# Patient Record
Sex: FEMALE | Race: WHITE | Employment: OTHER | URBAN - METROPOLITAN AREA
[De-identification: names, ages, dates, MRNs, and addresses within clinical notes are randomized per-mention and may not be internally consistent; named-entity substitution may affect disease eponyms.]

---

## 2017-06-05 ENCOUNTER — OFFICE VISIT (OUTPATIENT)
Dept: FAMILY MEDICINE CLINIC | Facility: CLINIC | Age: 67
End: 2017-06-05

## 2017-06-05 VITALS — RESPIRATION RATE: 20 BRPM | HEART RATE: 80 BPM | OXYGEN SATURATION: 93 %

## 2017-06-05 DIAGNOSIS — R21 RASH AND NONSPECIFIC SKIN ERUPTION: Primary | ICD-10-CM

## 2017-06-05 PROCEDURE — 99203 OFFICE O/P NEW LOW 30 MIN: CPT | Performed by: NURSE PRACTITIONER

## 2017-06-05 RX ORDER — CEPHALEXIN 500 MG/1
CAPSULE ORAL
COMMUNITY
Start: 2016-10-25 | End: 2017-10-31

## 2017-06-05 RX ORDER — BUMETANIDE 1 MG/1
TABLET ORAL
COMMUNITY
Start: 2016-09-25 | End: 2017-10-31

## 2017-06-05 RX ORDER — BUDESONIDE AND FORMOTEROL FUMARATE DIHYDRATE 160; 4.5 UG/1; UG/1
AEROSOL RESPIRATORY (INHALATION)
COMMUNITY
Start: 2016-10-10 | End: 2017-10-31

## 2017-06-05 RX ORDER — CLONAZEPAM 0.5 MG/1
TABLET ORAL
COMMUNITY
Start: 2016-09-09 | End: 2017-10-31

## 2017-06-05 RX ORDER — POTASSIUM CHLORIDE 20 MEQ/1
TABLET, EXTENDED RELEASE ORAL
COMMUNITY
Start: 2016-10-28 | End: 2017-10-31

## 2017-06-05 RX ORDER — OMEPRAZOLE 40 MG/1
CAPSULE, DELAYED RELEASE ORAL
COMMUNITY
Start: 2016-10-07 | End: 2017-10-31 | Stop reason: ALTCHOICE

## 2017-06-05 RX ORDER — WARFARIN SODIUM 6 MG/1
TABLET ORAL
COMMUNITY
Start: 2016-11-01 | End: 2017-10-23 | Stop reason: ALTCHOICE

## 2017-06-05 NOTE — PROGRESS NOTES
CHIEF COMPLAINT:   Patient presents with:  Rash: left lower leg for 2-3 months         HPI:   Rony Muñoz is a 75year old female who presents for evaluation of a rash. Per patient rash started in the past 2-3 months.   Reports was red and itchy, now jus Warfarin Sodium (COUMADIN) 5 MG Oral Tab Take 5 mg by mouth daily. Disp:  Rfl:    ramipril (ALTACE) 5 MG Oral Cap Take 5 mg by mouth daily.  Disp:  Rfl:    fluticasone-salmeterol (ADVAIR DISKUS) 500-50 MCG/DOSE Inhalation Aerosol Powder, Breath Activated No surrouding redness, warmth. No edema to area of increased redness. BLE with dry flaking skin. EYES: PERRLA, EOMI, conjunctiva are clear  CARDIO: LVAD present  JOINTS/MSK: normal ROM of lower extremities. Very mild non-pitting edema to BLE.  No calf ten

## 2017-10-06 ENCOUNTER — OFFICE VISIT (OUTPATIENT)
Dept: PHYSICAL THERAPY | Age: 67
End: 2017-10-06
Attending: INTERNAL MEDICINE
Payer: MEDICARE

## 2017-10-06 PROCEDURE — 97163 PT EVAL HIGH COMPLEX 45 MIN: CPT | Performed by: PHYSICAL THERAPIST

## 2017-10-06 NOTE — PROGRESS NOTES
NEUROLOGICAL EVALUATION:   Referring Physician: Surinder Riley MD  Diagnosis: Gait instability (R26.81)  Heart failure ([address] (I50.9)  LVAD (left ventricular assist device) present [facility]) (A94.885)     Date of Service: 10/6/2017     PATIENT Ramila Krishna awareness. These impairments are leading to functional limitations including;  Listed above    Alexandro Melvin would benefit from skilled Physical Therapy to address the above impairments to restore PLOF.      Precautions:  Pacemaker    LVAD (brand: Heartware) Restr clinical rationale, this evaluation involved High Complexity decision making due to 3+ personal factors/comorbidities, 4+ body structures involved/activity limitations, and unstable symptoms including vital sign response.   PLAN OF CARE:    Goals:  · Pt britta certification required: Yes  I certify the need for these services furnished under this plan of treatment and while under my care.     X___________________________________________________ Date____________________    Certification From: 50/1/2859  To:1/4/201

## 2017-10-09 ENCOUNTER — APPOINTMENT (OUTPATIENT)
Dept: PHYSICAL THERAPY | Age: 67
End: 2017-10-09
Payer: MEDICARE

## 2017-10-16 ENCOUNTER — HOSPITAL ENCOUNTER (OUTPATIENT)
Dept: PHYSICAL THERAPY | Facility: HOSPITAL | Age: 67
Setting detail: THERAPIES SERIES
Discharge: HOME | End: 2017-10-16
Attending: PHYSICAL MEDICINE & REHABILITATION
Payer: MEDICARE

## 2017-10-16 ENCOUNTER — TELEPHONE (OUTPATIENT)
Dept: PHYSICAL THERAPY | Facility: HOSPITAL | Age: 67
End: 2017-10-16

## 2017-10-16 PROCEDURE — 97112 NEUROMUSCULAR REEDUCATION: CPT

## 2017-10-16 PROCEDURE — 97110 THERAPEUTIC EXERCISES: CPT

## 2017-10-16 NOTE — PROGRESS NOTES
Dx: Gait instability, Heart failure, LVAD (left ventricular assist device) present          Authorized # of Visits:  10         Next MD visit: none scheduled  Fall Risk: High        Precautions:  Pacemaker, O2 2L,    LVAD (brand: Heartware) Restrictions: (10 visits)  · Pt will improve functional hip strength to demonstrate ability to ascend/descend 1 flight of stairs reciprocally without use of handrail (10 visits)  · Pt will be independent and compliant with comprehensive HEP to maintain progress achieved

## 2017-10-17 NOTE — ADDENDUM NOTE
Encounter addended by: Anastasiia Brito PT on: 10/16/2017  9:13 PM<BR>    Actions taken: Sign clinical note

## 2017-10-18 ENCOUNTER — TELEPHONE (OUTPATIENT)
Dept: PHYSICAL THERAPY | Facility: HOSPITAL | Age: 67
End: 2017-10-18

## 2017-10-18 ENCOUNTER — HOSPITAL ENCOUNTER (OUTPATIENT)
Dept: PHYSICAL THERAPY | Facility: HOSPITAL | Age: 67
Setting detail: THERAPIES SERIES
Discharge: HOME | End: 2017-10-18
Attending: PHYSICAL MEDICINE & REHABILITATION
Payer: MEDICARE

## 2017-10-18 PROCEDURE — 97112 NEUROMUSCULAR REEDUCATION: CPT

## 2017-10-18 PROCEDURE — 97530 THERAPEUTIC ACTIVITIES: CPT

## 2017-10-18 PROCEDURE — 97110 THERAPEUTIC EXERCISES: CPT

## 2017-10-18 NOTE — TELEPHONE ENCOUNTER
Spoke with patient's LVAD nurse coordinator Nuno Peña about patient weaning from 2L supplemental O2. Patient told her she has only been wearing at home when she feels she is in need of it.  Elvira Olea states ok for patient to use O2 in PT on as needed ba

## 2017-10-18 NOTE — PROGRESS NOTES
Dx: Gait instability, Heart failure, LVAD (left ventricular assist device) present          Authorized # of Visits:  10         Next MD visit: none scheduled  Fall Risk: High        Precautions:  Pacemaker, O2 2L,    LVAD (brand: Heartware) Restrictions: ability to ascend/descend 1 flight of stairs reciprocally without use of handrail (10 visits) - progress  · Pt will be independent and compliant with comprehensive HEP to maintain progress achieved in PT (10 visits) - issued    Plan: Continue balance, stre

## 2017-10-20 ENCOUNTER — APPOINTMENT (OUTPATIENT)
Dept: PHYSICAL THERAPY | Facility: HOSPITAL | Age: 67
End: 2017-10-20
Attending: PHYSICAL MEDICINE & REHABILITATION
Payer: MEDICARE

## 2017-10-23 PROBLEM — C50.919 MALIGNANT NEOPLASM OF BREAST (HCC): Status: ACTIVE | Noted: 2017-10-23

## 2017-10-23 PROBLEM — E04.2 MULTINODULAR GOITER: Status: ACTIVE | Noted: 2017-10-23

## 2017-10-23 PROBLEM — E04.2 MULTIPLE THYROID NODULES: Status: ACTIVE | Noted: 2017-10-23

## 2017-10-25 ENCOUNTER — HOSPITAL ENCOUNTER (OUTPATIENT)
Dept: PHYSICAL THERAPY | Facility: HOSPITAL | Age: 67
Setting detail: THERAPIES SERIES
Discharge: HOME | End: 2017-10-25
Attending: PHYSICAL MEDICINE & REHABILITATION
Payer: MEDICARE

## 2017-10-25 PROCEDURE — 97530 THERAPEUTIC ACTIVITIES: CPT

## 2017-10-25 PROCEDURE — 97110 THERAPEUTIC EXERCISES: CPT

## 2017-10-25 PROCEDURE — 97112 NEUROMUSCULAR REEDUCATION: CPT

## 2017-10-25 NOTE — PROGRESS NOTES
Dx: Gait instability, Heart failure, LVAD (left ventricular assist device) present          Authorized # of Visits:  10         Next MD visit: none scheduled  Fall Risk: High        Precautions:  Pacemaker, O2 2L,    LVAD (brand: Heartware) Restrictions: participation in ADL such as community ambulation(10 visits) - progress  · Pt will perform DGI with score of 20/24 or greater with least restrictive AD to demonstrate ability to ambulate safely in crowded and busy environments (10 visits)  · Pt will be abl throughout session       - Airex beam step ups, no UEs x 8 R/L Shuttle  - DLS, 51# (65# too hard) x 25       - Seated RTB rows x 20 Standing RTB rows with set-up x 20       Skilled Services: Continued activity tolerance training within patient's tolerance.

## 2017-10-27 ENCOUNTER — HOSPITAL ENCOUNTER (OUTPATIENT)
Dept: PHYSICAL THERAPY | Facility: HOSPITAL | Age: 67
Setting detail: THERAPIES SERIES
Discharge: HOME | End: 2017-10-27
Attending: PHYSICAL MEDICINE & REHABILITATION
Payer: MEDICARE

## 2017-10-27 PROCEDURE — 97112 NEUROMUSCULAR REEDUCATION: CPT

## 2017-10-27 PROCEDURE — 97110 THERAPEUTIC EXERCISES: CPT

## 2017-10-27 PROCEDURE — 97530 THERAPEUTIC ACTIVITIES: CPT

## 2017-10-27 NOTE — PROGRESS NOTES
Dx: Gait instability, Heart failure, LVAD (left ventricular assist device) present          Authorized # of Visits:  10         Next MD visit: none scheduled  Fall Risk: High        Precautions:  Pacemaker, O2 2L,    LVAD (brand: Heartware) Restrictions: use of handrail (10 visits) - progress, 13 steps with handrail assist, generally reciprocal, occasional step-to  · Pt will be independent and compliant with comprehensive HEP to maintain progress achieved in PT (10 visits) - issued and progressed    Plan: ups, no UEs x 8 R/L Shuttle  - DLS, 69# (83# too hard) x 25 Shuttle  - DLS, 58# x 30 (likes this a lot)      - Seated RTB rows x 20 Standing RTB rows with set-up x 20 Rockerboard  - AP x 25  - ML x 25      Skilled Services: Continued activity tolerance tra

## 2017-10-30 ENCOUNTER — HOSPITAL ENCOUNTER (OUTPATIENT)
Dept: PHYSICAL THERAPY | Facility: HOSPITAL | Age: 67
Setting detail: THERAPIES SERIES
Discharge: HOME | End: 2017-10-30
Attending: PHYSICAL MEDICINE & REHABILITATION
Payer: MEDICARE

## 2017-10-30 PROBLEM — J45.20 MILD INTERMITTENT ASTHMA WITHOUT COMPLICATION: Status: ACTIVE | Noted: 2017-10-30

## 2017-10-30 PROBLEM — I10 ESSENTIAL HYPERTENSION: Status: ACTIVE | Noted: 2017-10-30

## 2017-10-30 PROBLEM — K21.9 CHRONIC GERD: Status: ACTIVE | Noted: 2017-10-30

## 2017-10-30 PROBLEM — C50.912 MALIGNANT NEOPLASM OF LEFT FEMALE BREAST, UNSPECIFIED ESTROGEN RECEPTOR STATUS, UNSPECIFIED SITE OF BREAST (HCC): Status: ACTIVE | Noted: 2017-10-23

## 2017-10-30 PROCEDURE — 97530 THERAPEUTIC ACTIVITIES: CPT

## 2017-10-30 PROCEDURE — 97110 THERAPEUTIC EXERCISES: CPT

## 2017-10-30 PROCEDURE — 97112 NEUROMUSCULAR REEDUCATION: CPT

## 2017-10-30 NOTE — PROGRESS NOTES
Dx: Gait instability, Heart failure, LVAD (left ventricular assist device) present          Authorized # of Visits:  10         Next MD visit: none scheduled  Fall Risk: High        Precautions:  Pacemaker, O2 2L,    LVAD (brand: Heartware) Restrictions: will perform DGI with score of 20/24 or greater with least restrictive AD to demonstrate ability to ambulate safely in crowded and busy environments (10 visits)  · Pt will be able to squat to  light objects around the house without loss of stability - p/u 4# ball, press OH x 1 (tto tired)  - DLS catch on airex 2 x 10  - step ups to airex x 15 R/L - DLS on airex 4# OH ball lifts x 10 (tired)     Side step x 2 laps // bars with rest as needed Side step in bars, YTBx 2 laps - - Airex  - step to with catc

## 2017-11-01 ENCOUNTER — HOSPITAL ENCOUNTER (OUTPATIENT)
Dept: PHYSICAL THERAPY | Facility: HOSPITAL | Age: 67
Setting detail: THERAPIES SERIES
Discharge: HOME | End: 2017-11-01
Attending: PHYSICAL MEDICINE & REHABILITATION
Payer: MEDICARE

## 2017-11-01 PROCEDURE — 97110 THERAPEUTIC EXERCISES: CPT

## 2017-11-01 PROCEDURE — 97112 NEUROMUSCULAR REEDUCATION: CPT

## 2017-11-01 PROCEDURE — 97530 THERAPEUTIC ACTIVITIES: CPT

## 2017-11-01 NOTE — PROGRESS NOTES
Dx: Gait instability, Heart failure, LVAD (left ventricular assist device) present          Authorized # of Visits:  10         Next MD visit: none scheduled  Fall Risk: High        Precautions:  Pacemaker, O2 2L,    LVAD (brand: Heartware) Restrictions: ability to ascend/descend 1 flight of stairs reciprocally without use of handrail (10 visits) - progress, 13 steps with handrail assist, generally reciprocal, occasional step-to  · Pt will be independent and compliant with comprehensive HEP to maintain pro 2 laps // bars with rest as needed Side step in bars, YTBx 2 laps - - Airex  - step to with catch against rebounder x 10 R/L  - alt cone stack tap x 14, x 10 Airex  - DLS catch 2 x 10    P/u cones (6) from 6\">4\" box x 1 R/L ea P/u 4# crate from floor x 1

## 2017-11-06 ENCOUNTER — HOSPITAL ENCOUNTER (OUTPATIENT)
Dept: PHYSICAL THERAPY | Facility: HOSPITAL | Age: 67
Setting detail: THERAPIES SERIES
End: 2017-11-06
Attending: PHYSICAL MEDICINE & REHABILITATION
Payer: MEDICARE

## 2017-11-06 PROCEDURE — 97110 THERAPEUTIC EXERCISES: CPT

## 2017-11-06 NOTE — PROGRESS NOTES
Dx: Gait instability, Heart failure, LVAD (left ventricular assist device) present          Authorized # of Visits:  10         Next MD visit: none scheduled  Fall Risk: High        Precautions:  Pacemaker, O2 2L,    LVAD (brand: Heartware) Restrictions: in ADL such as community ambulation(10 visits) - original goal met, progress to <14 sec  · Pt will perform DGI with score of 20/24 or greater with least restrictive AD to demonstrate ability to ambulate safely in crowded and busy environments (10 visits) march, light UEs x 20  - alt tap to cone stack 2 x 10, light UEs       - alt tap to cone stack 2 x 10, light UEs Obstacle course; hurdles, airex, 4\" step; no AD, CGA x 6 (fatigued) - p/u 4# ball, press OH x 1 (tto tired)  - DLS catch on airex 2 x 10  - st KESHAWN castillo    Charges:  Therex x 2      Total Timed Treatment: 30 min  Total Treatment Time: 30 min

## 2017-11-08 ENCOUNTER — APPOINTMENT (OUTPATIENT)
Dept: PHYSICAL THERAPY | Facility: HOSPITAL | Age: 67
End: 2017-11-08
Attending: PHYSICAL MEDICINE & REHABILITATION
Payer: MEDICARE

## 2017-11-13 ENCOUNTER — APPOINTMENT (OUTPATIENT)
Dept: PHYSICAL THERAPY | Facility: HOSPITAL | Age: 67
End: 2017-11-13
Attending: PHYSICAL MEDICINE & REHABILITATION
Payer: MEDICARE

## 2017-11-15 ENCOUNTER — APPOINTMENT (OUTPATIENT)
Dept: PHYSICAL THERAPY | Facility: HOSPITAL | Age: 67
End: 2017-11-15
Attending: PHYSICAL MEDICINE & REHABILITATION
Payer: MEDICARE

## 2017-11-20 ENCOUNTER — TELEPHONE (OUTPATIENT)
Dept: PHYSICAL THERAPY | Facility: HOSPITAL | Age: 67
End: 2017-11-20

## 2017-11-20 ENCOUNTER — APPOINTMENT (OUTPATIENT)
Dept: PHYSICAL THERAPY | Facility: HOSPITAL | Age: 67
End: 2017-11-20
Attending: PHYSICAL MEDICINE & REHABILITATION
Payer: MEDICARE

## 2017-11-20 NOTE — TELEPHONE ENCOUNTER
Patient called to follow-up with physical therapist about her change in status. She was hospitalized last week for a GI bleed, but is back home now.  Has to cancel her appts for this week because hemoglobin levels are still so low that it makes it difficult

## 2017-11-22 ENCOUNTER — APPOINTMENT (OUTPATIENT)
Dept: PHYSICAL THERAPY | Facility: HOSPITAL | Age: 67
End: 2017-11-22
Attending: PHYSICAL MEDICINE & REHABILITATION
Payer: MEDICARE

## 2017-12-08 ENCOUNTER — HOSPITAL ENCOUNTER (OUTPATIENT)
Dept: PHYSICAL THERAPY | Facility: HOSPITAL | Age: 67
Setting detail: THERAPIES SERIES
Discharge: HOME | End: 2017-12-08
Attending: INTERNAL MEDICINE
Payer: MEDICARE

## 2017-12-08 PROCEDURE — 97110 THERAPEUTIC EXERCISES: CPT

## 2017-12-08 PROCEDURE — 97112 NEUROMUSCULAR REEDUCATION: CPT

## 2017-12-08 NOTE — PROGRESS NOTES
Progress Summary  Dx: Gait instability, Heart failure, LVAD (left ventricular assist device) present          Authorized # of Visits:  10         Next MD visit: none scheduled  Fall Risk: High        Precautions:  Pacemaker, O2 2L,    LVAD (brand: Heartwa together: >30 sec  Modified Tandem: >30 sec HELEN  Tandem: R back 26 sec, L back 24 sec  SLS: 6 sec HELEN    Goals: (To be completed in 18 visits or less)  · Pt will demonstrate improved SLS to >10 seconds HELEN to promote safety and decrease risk of falls on un at Dept: [phone]. Sincerely,  Electronically signed by therapist: Terry Alcantar PT    [de-identified] certification required: Yes  I certify the need for these services furnished under this plan of treatment and while under my care.     X_______________  press HELEN x 10  - 4# bicep curls HELEN x 10   Side step x 2 laps // bars with rest as needed Side step in bars, YTBx 2 laps - - Airex  - step to with catch against rebounder x 10 R/L  - alt cone stack tap x 14, x 10 Airex  - DLS catch 2 x 10 - Airex 45 min

## 2017-12-13 ENCOUNTER — HOSPITAL ENCOUNTER (OUTPATIENT)
Dept: PHYSICAL THERAPY | Facility: HOSPITAL | Age: 67
Setting detail: THERAPIES SERIES
Discharge: HOME | End: 2017-12-13
Attending: INTERNAL MEDICINE
Payer: MEDICARE

## 2017-12-13 PROCEDURE — 97530 THERAPEUTIC ACTIVITIES: CPT

## 2017-12-13 PROCEDURE — 97110 THERAPEUTIC EXERCISES: CPT

## 2017-12-13 PROCEDURE — 97112 NEUROMUSCULAR REEDUCATION: CPT

## 2017-12-13 NOTE — PROGRESS NOTES
Dx: Gait instability, Heart failure, LVAD (left ventricular assist device) present          Authorized # of Visits:  10         Next MD visit: none scheduled  Fall Risk: High        Precautions:  Pacemaker, O2 2L,    LVAD (brand: Heartware) Restrictions: be able to squat to  light objects around the house without loss of stability.  - progress  · Pt will improve functional hip strength to demonstrate ability to ascend/descend 1 flight of stairs reciprocally without use of handrail. - progress, 13 chano alt tap to cone stack 2 x 10, light UEs Obstacle course; hurdles, airex, 4\" step; no AD, CGA x 6 (fatigued) - p/u 4# ball, press OH x 1 (tto tired)  - DLS catch on airex 2 x 10  - step ups to airex x 15 R/L - DLS on airex 4# OH ball lifts x 10 (tired) 2# Rockerboard  - AP x 25  - ML x 25 Bosu  - alt fwd lunge x 10 R/L with standing rests  Bosu  - alt fwd lunge x 12 R/L with standing rests  - fwd step up x x 10 R/L  - Bosu  - alt fwd lunge, light to no UEs x 10 R/L Obstacle course: hurdles, airex, airex benson

## 2017-12-18 ENCOUNTER — HOSPITAL ENCOUNTER (OUTPATIENT)
Dept: PHYSICAL THERAPY | Facility: HOSPITAL | Age: 67
Setting detail: THERAPIES SERIES
Discharge: HOME | End: 2017-12-18
Attending: INTERNAL MEDICINE
Payer: MEDICARE

## 2017-12-18 PROCEDURE — 97112 NEUROMUSCULAR REEDUCATION: CPT

## 2017-12-18 PROCEDURE — 97110 THERAPEUTIC EXERCISES: CPT

## 2017-12-18 PROCEDURE — 97530 THERAPEUTIC ACTIVITIES: CPT

## 2017-12-20 ENCOUNTER — HOSPITAL ENCOUNTER (OUTPATIENT)
Dept: PHYSICAL THERAPY | Facility: HOSPITAL | Age: 67
Setting detail: THERAPIES SERIES
Discharge: HOME | End: 2017-12-20
Attending: INTERNAL MEDICINE
Payer: MEDICARE

## 2017-12-20 PROCEDURE — 97530 THERAPEUTIC ACTIVITIES: CPT

## 2017-12-20 PROCEDURE — 97112 NEUROMUSCULAR REEDUCATION: CPT

## 2017-12-20 PROCEDURE — 97110 THERAPEUTIC EXERCISES: CPT

## 2017-12-20 NOTE — PROGRESS NOTES
Dx: Gait instability, Heart failure, LVAD (left ventricular assist device) present          Authorized # of Visits:  10         Next MD visit: none scheduled  Fall Risk: High        Precautions:  Pacemaker, O2 2L,    LVAD (brand: Heartware) Restrictions: such as grass. - progress  · Pt will perform TUG in <20 seconds with least restrictive AD, demonstrating improved gait speed for improved participation in ADL such as community ambulation.  - original goal met, progress to <10 sec  · Pt will perform DGI wit ascent, occasional step-to descent 6\" step up  - fwd x 12 R/L  - side step up/over x 12 (several rests due to fatigue) Flight of stairs with handrail x 13 steps, reciprocal CGA for safety (improved) - - - Flight of stairs with handrail x 8 steps, reciproc control)  - multi-direct catch, feet together x 2 min Large rockerboard  - AP x 20  - balance 3 x 20s  - Bosu  - alt wt shift/lunge occ UEs x 12 R/L  - fwd step up, light UEs x 11 R/L   Shuttle  - DLS, 49# (98# too hard) x 25 Shuttle  - DLS, 86# x 30 (like

## 2017-12-27 ENCOUNTER — HOSPITAL ENCOUNTER (OUTPATIENT)
Dept: PHYSICAL THERAPY | Facility: HOSPITAL | Age: 67
Setting detail: THERAPIES SERIES
Discharge: HOME | End: 2017-12-27
Attending: INTERNAL MEDICINE
Payer: MEDICARE

## 2017-12-27 PROCEDURE — 97110 THERAPEUTIC EXERCISES: CPT

## 2017-12-27 PROCEDURE — 97112 NEUROMUSCULAR REEDUCATION: CPT

## 2017-12-29 ENCOUNTER — HOSPITAL ENCOUNTER (OUTPATIENT)
Dept: PHYSICAL THERAPY | Facility: HOSPITAL | Age: 67
Setting detail: THERAPIES SERIES
Discharge: HOME | End: 2017-12-29
Attending: INTERNAL MEDICINE
Payer: MEDICARE

## 2017-12-29 PROCEDURE — 97112 NEUROMUSCULAR REEDUCATION: CPT

## 2017-12-29 PROCEDURE — 97110 THERAPEUTIC EXERCISES: CPT

## 2017-12-29 PROCEDURE — 97530 THERAPEUTIC ACTIVITIES: CPT

## 2017-12-29 NOTE — PROGRESS NOTES
Dx: Gait instability, Heart failure, LVAD (left ventricular assist device) present          Authorized # of Visits:  10         Next MD visit: none scheduled  Fall Risk: High        Precautions:  Pacemaker, O2 2L,    LVAD (brand: Heartware) Restrictions: original goal met, progress to <10 sec  · Pt will perform DGI with score of 20/24 or greater with least restrictive AD to demonstrate ability to ambulate safely in crowded and busy environments.   · Pt will be able to squat to  light objects around t pulley rows, 7# x 15, 3# x 15  - weighted pulley ext, 3# 2 x 15 Airex beam  - fwd tandem x 6 laps  - side step x 6 laps   2# hand weights  - alt flexion x 10 R/L  -  press HELEN x 10  - 4# bicep curls HELEN x 10 - 2# hand weights  - alt flexion x 10 R/ 10 R/L  - Bosu  - alt fwd lunge, light to no UEs x 10 R/L Obstacle course: hurdles, airex, airex beam lateral, p/cones x 4 SBA Obstacle course: hurdles, airex SBA  - with unsteady items on plate x 4  - p/u items to put on plate x 4 Obstacle course: hurdles

## 2018-01-03 ENCOUNTER — HOSPITAL ENCOUNTER (OUTPATIENT)
Dept: PHYSICAL THERAPY | Facility: HOSPITAL | Age: 68
Setting detail: THERAPIES SERIES
Discharge: HOME | End: 2018-01-03
Attending: INTERNAL MEDICINE
Payer: MEDICARE

## 2018-01-03 PROCEDURE — 97530 THERAPEUTIC ACTIVITIES: CPT

## 2018-01-03 PROCEDURE — 97110 THERAPEUTIC EXERCISES: CPT

## 2018-01-03 PROCEDURE — 97112 NEUROMUSCULAR REEDUCATION: CPT

## 2018-01-05 ENCOUNTER — HOSPITAL ENCOUNTER (OUTPATIENT)
Dept: PHYSICAL THERAPY | Facility: HOSPITAL | Age: 68
Setting detail: THERAPIES SERIES
Discharge: HOME | End: 2018-01-05
Attending: INTERNAL MEDICINE
Payer: MEDICARE

## 2018-01-05 PROCEDURE — 97110 THERAPEUTIC EXERCISES: CPT

## 2018-01-05 PROCEDURE — 97112 NEUROMUSCULAR REEDUCATION: CPT

## 2018-01-05 PROCEDURE — 97530 THERAPEUTIC ACTIVITIES: CPT

## 2018-01-05 NOTE — PROGRESS NOTES
Progress Summary  Dx: Gait instability, Heart failure, LVAD (left ventricular assist device) present          Authorized # of Visits:  MCR (requested 22)         Next MD visit: N/A  Fall Risk: High        Precautions:  Pacemaker, O2 2L,    LVAD (brand: He Balance Test: (1/5/18)  Feet together: >30 sec  Modified Tandem: >30 sec HELEN  Tandem: R back >30 sec, L back 28 sec  SLS: R 8 sec; L 6 sec    Goals: (To be completed in 22 visits or less)  · Pt will demonstrate improved SLS to >10 seconds HELEN to promote sa sign and return this letter via fax as soon as possible to [phone]. If you have any questions, please contact me at Dept: [phone].     Sincerely,  Electronically signed by therapist: Joelle Syed PT, DPT    Physician's certification required: Yes 3 x 10 YTB  - side step x 2 1/2 laps // bars  - fwd wide x 2 1/2 laps // bars  - retro wide x 3 1/2 laps // bars Retrieve cones, 1#, 2# (8) from [facility] cabinet  - level ground x 2  - with step up/down from airex x 1 (fatigued) - Lunge/step to airex with catch course: hurdles, airex, airex beam lateral, p/cones x 4 SBA Obstacle course: hurdles, airex SBA  - with unsteady items on plate x 4  - p/u items to put on plate x 4 Obstacle course: hurdles, airex, retrieve cone at the end x 3 laps SBA (very fatiguing) Obs

## 2018-01-08 ENCOUNTER — HOSPITAL ENCOUNTER (OUTPATIENT)
Dept: PHYSICAL THERAPY | Facility: HOSPITAL | Age: 68
Setting detail: THERAPIES SERIES
Discharge: HOME | End: 2018-01-08
Attending: INTERNAL MEDICINE
Payer: MEDICARE

## 2018-01-08 PROCEDURE — 97530 THERAPEUTIC ACTIVITIES: CPT

## 2018-01-08 PROCEDURE — 97112 NEUROMUSCULAR REEDUCATION: CPT

## 2018-01-08 PROCEDURE — 97110 THERAPEUTIC EXERCISES: CPT

## 2018-01-08 NOTE — PROGRESS NOTES
Dx: Gait instability, Heart failure, LVAD (left ventricular assist device) present          Authorized # of Visits:  MCR (requested 22)         Next MD visit: N/A  Fall Risk: High        Precautions:  Pacemaker, O2 2L,    LVAD (brand: Heartware) Restrictio participation in ADL such as community ambulation.  - original goal met, progress to <10 sec - MET new goal  · Pt will perform DGI with score of 20/24 or greater with least restrictive AD to demonstrate ability to ambulate safely in crowded and busy environ edu POC for PT, oxygen uptake as effected by med described Seated on SB  - weighted pulley rows, 7# x 15, 3# x 15  - weighted pulley ext, 3# 2 x 15 Airex beam  - fwd tandem x 6 laps  - side step x 6 laps Airex beam  - fwd tandem x 6 laps  - side step x 6 l light UEs x 11 R/L 4 way standing YTB x 15 all directions R and L STS on airex with alt cone tap x 8 (faitgued) - Fwd, retro, side step, squat to hit targets as called out by random by PT, with and without 2# hand weight x 8 min Mat table exercises (ideas

## 2018-01-10 ENCOUNTER — HOSPITAL ENCOUNTER (OUTPATIENT)
Dept: PHYSICAL THERAPY | Facility: HOSPITAL | Age: 68
Setting detail: THERAPIES SERIES
Discharge: HOME | End: 2018-01-10
Attending: INTERNAL MEDICINE
Payer: MEDICARE

## 2018-01-10 PROCEDURE — 97112 NEUROMUSCULAR REEDUCATION: CPT

## 2018-01-10 PROCEDURE — 97530 THERAPEUTIC ACTIVITIES: CPT

## 2018-01-10 PROCEDURE — 97110 THERAPEUTIC EXERCISES: CPT

## 2018-01-10 NOTE — PROGRESS NOTES
Dx: Gait instability, Heart failure, LVAD (left ventricular assist device) present          Authorized # of Visits:  MCR (requested 22)         Next MD visit: N/A  Fall Risk: High        Precautions:  Pacemaker, O2 2L,    LVAD (brand: Heartware) Restrictio improved gait speed for improved participation in ADL such as community ambulation.  - original goal met, progress to <10 sec - MET new goal  · Pt will perform DGI with score of 20/24 or greater with least restrictive AD to demonstrate ability to ambulate s reciprocal SBA for safety Seated on SB min A  - marching x 20  - alt UE/LE x 10 R/L Patient edu POC for PT, oxygen uptake as effected by med described Seated on SB  - weighted pulley rows, 7# x 15, 3# x 15  - weighted pulley ext, 3# 2 x 15 Airex beam  - fw 20  - balance 3 x 20s  - Bosu  - alt wt shift/lunge occ UEs x 12 R/L  - fwd step up, light UEs x 11 R/L 4 way standing YTB x 15 all directions R and L STS on airex with alt cone tap x 8 (faitgued) - Fwd, retro, side step, squat to hit targets as called out KESHAWN castillo    Charges:  Therex x 1, NR x 1, Theract x 1     Total Timed Treatment: 43 min  Total Treatment Time: 48 min

## 2018-01-15 ENCOUNTER — HOSPITAL ENCOUNTER (OUTPATIENT)
Dept: PHYSICAL THERAPY | Facility: HOSPITAL | Age: 68
Setting detail: THERAPIES SERIES
Discharge: HOME | End: 2018-01-15
Attending: INTERNAL MEDICINE
Payer: MEDICARE

## 2018-01-15 PROCEDURE — 97112 NEUROMUSCULAR REEDUCATION: CPT

## 2018-01-15 PROCEDURE — 97530 THERAPEUTIC ACTIVITIES: CPT

## 2018-01-15 PROCEDURE — 97110 THERAPEUTIC EXERCISES: CPT

## 2018-01-15 NOTE — PROGRESS NOTES
Dx: Gait instability, Heart failure, LVAD (left ventricular assist device) present          Authorized # of Visits:  MCR (requested 22)         Next MD visit: N/A  Fall Risk: High        Precautions:  Pacemaker, O2 2L,    LVAD (brand: Heartware) Restrictio (To be completed in 22 visits or less)  · Pt will demonstrate improved SLS to >10 seconds HELEN to promote safety and decrease risk of falls on uneven surfaces such as grass. - progressing, nearly met  · Pt will perform TUG in <20 seconds with least restrict safety SBA - 6 inch fwd step up, alt x 10 R/L, no UEs  - 6 inch side step up x 10 R/L [place] step ups x 12 R/L, no UEs Bosu step ups, light UEs as needed, generally no UEs x 12 R/L   Seated on SB min A  - marching x 20  - alt UE/LE x 10 R/L Patient edu airex x 10, x 5   - - Rockerboard, no UEs x 25 Rockerboard, no UEs x 25 - Goals reassessment as listed in objective Bosu  - DLS balance 3 x 22Q CGA Large rockerboard midline balance 3 x 30s SBA-CGA Airex beam  - tandem fed/retro x 6  - side step x 6   - Be safely. Therex with progression within tolerance. NR compliant and dynamic surfaces to dec risk for falls and with inc community participation. Education on HEP progression and POC within needs.    HEP: walking program, standing marching in walker, side chano

## 2018-01-17 ENCOUNTER — HOSPITAL ENCOUNTER (OUTPATIENT)
Dept: PHYSICAL THERAPY | Facility: HOSPITAL | Age: 68
Setting detail: THERAPIES SERIES
Discharge: HOME | End: 2018-01-17
Attending: INTERNAL MEDICINE
Payer: MEDICARE

## 2018-01-17 PROCEDURE — 97530 THERAPEUTIC ACTIVITIES: CPT

## 2018-01-17 PROCEDURE — 97110 THERAPEUTIC EXERCISES: CPT

## 2018-01-17 PROCEDURE — 97112 NEUROMUSCULAR REEDUCATION: CPT

## 2018-01-17 NOTE — PROGRESS NOTES
Dx: Gait instability, Heart failure, LVAD (left ventricular assist device) present          Authorized # of Visits:  MCR (requested 22)         Next MD visit: N/A  Fall Risk: High        Precautions:  Pacemaker, O2 2L,    LVAD (brand: Heartware) Restrictio will be able to squat to  light objects around the house without loss of stability.  - progressing, nearly MET, small objects difficult  · Pt will improve functional hip strength to demonstrate ability to ascend/descend 1 flight of stairs reciprocall floor (8) SBA   Retrieve cones, 1#, 2# (8) from [facility] cabinet  - level ground x 2  - with step up/down from airex x 1 (fatigued) - Lunge/step to airex with catch x 9 R/L (fatiging)    - sit<>stand x 8  - STS with airex x 8 DLS on airex, tap 2# ball to 6 inch retro, side step, squat to hit targets as called out by random by PT 2 x 10, x 5 Pt edu POC, HEP x 8 min -   - Shuttle  - DLS, 17# x 20  - SLS, 31# x 15 R/L - Shuttle  - DLS, 67# x 20  - SLS, 31# x 15 R/L Shuttle  - DLS, 66# x 20  - SLS, 31# x 15 R/L TRX

## 2018-01-22 ENCOUNTER — APPOINTMENT (OUTPATIENT)
Dept: PHYSICAL THERAPY | Facility: HOSPITAL | Age: 68
End: 2018-01-22
Attending: INTERNAL MEDICINE
Payer: MEDICARE

## 2018-01-24 ENCOUNTER — APPOINTMENT (OUTPATIENT)
Dept: PHYSICAL THERAPY | Facility: HOSPITAL | Age: 68
End: 2018-01-24
Attending: INTERNAL MEDICINE
Payer: MEDICARE

## 2018-01-31 ENCOUNTER — HOSPITAL ENCOUNTER (OUTPATIENT)
Dept: PHYSICAL THERAPY | Facility: HOSPITAL | Age: 68
Setting detail: THERAPIES SERIES
Discharge: HOME | End: 2018-01-31
Attending: INTERNAL MEDICINE
Payer: MEDICARE

## 2018-01-31 PROCEDURE — 97530 THERAPEUTIC ACTIVITIES: CPT

## 2018-01-31 PROCEDURE — 97110 THERAPEUTIC EXERCISES: CPT

## 2018-01-31 NOTE — PROGRESS NOTES
Discharge Summary  Dx: Gait instability, Heart failure, LVAD (left ventricular assist device) present          Authorized # of Visits:  MCR (requested 22)         Next MD visit: N/A  Fall Risk: High        Precautions:  Pacemaker, O2 2L,    LVAD (brand: H Tandem: >30 sec HELEN  Tandem: R back >30 sec, L back 28 sec  SLS: R 11 sec (previously broken knee and ankle);  L 26 sec    FOTO: 60/100    Goals: (To be completed in 22 visits or less)  · Pt will demonstrate improved SLS to >10 seconds HELEN to promote safety 16/16 Date: 1/8/18   TX#: 17/22 Date: 1/10/18   TX#: 18/22 Date: 1/15/18   TX#: 19/22 Date: 1/17/18   TX#: 20/22 Date: 1/31/18   TX#: 21/22   NuStep L5 x 5 min 5 min walk, no walker SBA NuStep L6 x 5 min NuStep L6 x 5 min  6MWT NuStep L6 x 5 min NuStep L7 ladder\"  - fwd x 8  - with reaching, 1UE support x 8   Step up to \"step ladder\"  - fwd x 8  - with reaching, 1UE support x 8 Step up to \"step ladder\"  - fwd x 8  - with reaching, 1UE support x 8 Sit to stand on airex, immediate step off to hurdles (4) x 2 (fatigued) Seated with G flexbar   - N bends 2 x 10  - U bends 2 x 10  - twists 2 x 10  - shaking 2 x 20 R/L - - -   Skilled Services: Assessed goals as needed, determined appropriate for D/C from PT. Educated and discussed POC and HEP progression.    H

## 2019-02-09 ENCOUNTER — EXTERNAL RECORD (OUTPATIENT)
Dept: ORTHOPEDICS | Age: 69
End: 2019-02-09

## 2019-04-30 ENCOUNTER — ORDER TRANSCRIPTION (OUTPATIENT)
Dept: PHYSICAL THERAPY | Facility: HOSPITAL | Age: 69
End: 2019-04-30

## 2019-04-30 DIAGNOSIS — M25.572 JOINT PAIN OF ANKLE AND FOOT, LEFT: Primary | ICD-10-CM

## 2019-04-30 DIAGNOSIS — Z87.81 S/P ORIF (OPEN REDUCTION INTERNAL FIXATION) FRACTURE: ICD-10-CM

## 2019-04-30 DIAGNOSIS — Z98.890 S/P ORIF (OPEN REDUCTION INTERNAL FIXATION) FRACTURE: ICD-10-CM

## 2019-04-30 DIAGNOSIS — S93.402A SPRAIN OF LIGAMENT OF LEFT ANKLE, INITIAL ENCOUNTER: ICD-10-CM

## 2019-05-21 ENCOUNTER — HOSPITAL ENCOUNTER (OUTPATIENT)
Dept: PHYSICAL THERAPY | Facility: HOSPITAL | Age: 69
Setting detail: THERAPIES SERIES
Discharge: HOME | End: 2019-05-21
Attending: INTERNAL MEDICINE
Payer: MEDICARE

## 2019-05-21 PROCEDURE — 97163 PT EVAL HIGH COMPLEX 45 MIN: CPT

## 2019-05-21 NOTE — PROGRESS NOTES
LOWER EXTREMITY EVALUATION:   Referring Physician: Dr. Yang Serum  Diagnosis: Joint pain of ankle and foot, left (M25.572)  S/P ORIF (open reduction internal fixation) fracture (Z96.7,Z87.81)  Sprain of ligament of left ankle, initial encounter (R28.655N) level of function able to ambulate without assistive device, able to ambulate longer distances, was not using oxygen as often at home. Pt goals include improve activity tolerance, return to normal.  Past medical history was reviewed with Henry Vaughn.  Significan bilaterally. She puts minimal UE support through the cane. The patient requires occasional standing rest breaks when ambulating to the room. She has foot flat initial contact with the left lower extremity.   Palpation: point tenderness is present to ATF lig 8 visits over a 90 day period. Treatment will include: Manual Therapy; Therapeutic Exercises; Neuromuscular Re-education;  Therapeutic Activity; Gait Training; Electrical Stim; Pt education; Home exercise program instructions    Education or treatment limit

## 2019-05-23 ENCOUNTER — HOSPITAL ENCOUNTER (OUTPATIENT)
Dept: PHYSICAL THERAPY | Facility: HOSPITAL | Age: 69
Setting detail: THERAPIES SERIES
Discharge: HOME | End: 2019-05-23
Attending: ORTHOPAEDIC SURGERY
Payer: MEDICARE

## 2019-05-23 DIAGNOSIS — S93.402A SPRAIN OF LIGAMENT OF LEFT ANKLE, INITIAL ENCOUNTER: ICD-10-CM

## 2019-05-23 DIAGNOSIS — Z87.81 S/P ORIF (OPEN REDUCTION INTERNAL FIXATION) FRACTURE: ICD-10-CM

## 2019-05-23 DIAGNOSIS — Z98.890 S/P ORIF (OPEN REDUCTION INTERNAL FIXATION) FRACTURE: ICD-10-CM

## 2019-05-23 DIAGNOSIS — M25.572 JOINT PAIN OF ANKLE AND FOOT, LEFT: ICD-10-CM

## 2019-05-23 PROCEDURE — 97110 THERAPEUTIC EXERCISES: CPT

## 2019-05-23 PROCEDURE — 97140 MANUAL THERAPY 1/> REGIONS: CPT

## 2019-05-23 NOTE — PROGRESS NOTES
Dx: Joint pain of ankle and foot, left; S/P ORIF (open reduction internal fixation) fracture; Sprain of ligament of left ankle, initial encounter        Insurance (Authorized # of Visits):  8 requested [facility])         Authorizing Physician: Dr. Robi Burrell patient will demonstrate ankle eversion strength MMT that is at least 4+/5  2. The patient will demonstrate the ability to stand from a chair with minimal use of upper extremity assist  3.  The patient will demonstrate the ability to ambulate 200 feet witho

## 2019-05-29 ENCOUNTER — HOSPITAL ENCOUNTER (OUTPATIENT)
Dept: PHYSICAL THERAPY | Facility: HOSPITAL | Age: 69
Setting detail: THERAPIES SERIES
Discharge: HOME | End: 2019-05-29
Attending: INTERNAL MEDICINE
Payer: MEDICARE

## 2019-05-29 PROCEDURE — 97110 THERAPEUTIC EXERCISES: CPT

## 2019-05-29 PROCEDURE — 97112 NEUROMUSCULAR REEDUCATION: CPT

## 2019-05-29 NOTE — PROGRESS NOTES
Dx: Joint pain of ankle and foot, left; S/P ORIF (open reduction internal fixation) fracture; Sprain of ligament of left ankle, initial encounter        Insurance (Authorized # of Visits):  8 requested [facility])         Authorizing Physician: Dr. Waqas Dawn MD 4+/5  2. The patient will demonstrate the ability to stand from a chair with minimal use of upper extremity assist  3. The patient will demonstrate the ability to ambulate 200 feet without rest break to improve ability to perform community mobility  4.  The

## 2019-05-31 ENCOUNTER — HOSPITAL ENCOUNTER (OUTPATIENT)
Dept: PHYSICAL THERAPY | Facility: HOSPITAL | Age: 69
Setting detail: THERAPIES SERIES
Discharge: HOME | End: 2019-05-31
Attending: INTERNAL MEDICINE
Payer: MEDICARE

## 2019-05-31 PROCEDURE — 97140 MANUAL THERAPY 1/> REGIONS: CPT

## 2019-05-31 PROCEDURE — 97112 NEUROMUSCULAR REEDUCATION: CPT

## 2019-05-31 PROCEDURE — 97110 THERAPEUTIC EXERCISES: CPT

## 2019-05-31 NOTE — PROGRESS NOTES
Dx: Joint pain of ankle and foot, left; S/P ORIF (open reduction internal fixation) fracture; Sprain of ligament of left ankle, initial encounter        Insurance (Authorized # of Visits):  8 requested [facility])         Authorizing Physician: Dr. Vázquez ref.  letitia in dynamic balance   5. The patient will be independent and adherent in a comprehensive HEP. - issued and reviewed    Plan: Continue PT with progression as indicated.    Date: 5/23/2019  TX#: 2/8 Date: 5/29/19                TX#: 3/8 Date: 5/31/2019

## 2019-06-04 ENCOUNTER — HOSPITAL ENCOUNTER (OUTPATIENT)
Dept: PHYSICAL THERAPY | Facility: HOSPITAL | Age: 69
Setting detail: THERAPIES SERIES
Discharge: HOME | End: 2019-06-04
Attending: INTERNAL MEDICINE
Payer: MEDICARE

## 2019-06-04 PROCEDURE — 97110 THERAPEUTIC EXERCISES: CPT

## 2019-06-04 PROCEDURE — 97112 NEUROMUSCULAR REEDUCATION: CPT

## 2019-06-04 NOTE — PROGRESS NOTES
Dx: Joint pain of ankle and foot, left; S/P ORIF (open reduction internal fixation) fracture; Sprain of ligament of left ankle, initial encounter        Insurance (Authorized # of Visits):  8 requested [facility])         Authorizing Physician: Dr. Kacie Dawn MD functional strength/mobility and balance of affected LE. Goals: To be met in 8 visits  1. The patient will demonstrate ankle eversion strength MMT that is at least 4+/5. - progress  2.  The patient will demonstrate the ability to stand from a chair wi marches with active DF 2x10  - walking throughout session with cane, end session 100ft lap with cane as needed       Manual x 15 min  - L ankle and PF STM  - L ankle PROM  - L TCJ glides - Manual x9 min  -Ankle PROM into DF and eversion multiple bouts  -po

## 2019-06-06 ENCOUNTER — HOSPITAL ENCOUNTER (OUTPATIENT)
Dept: PHYSICAL THERAPY | Facility: HOSPITAL | Age: 69
Setting detail: THERAPIES SERIES
Discharge: HOME | End: 2019-06-06
Attending: INTERNAL MEDICINE
Payer: MEDICARE

## 2019-06-06 PROCEDURE — 97112 NEUROMUSCULAR REEDUCATION: CPT

## 2019-06-06 PROCEDURE — 97110 THERAPEUTIC EXERCISES: CPT

## 2019-06-06 NOTE — PROGRESS NOTES
Dx: Joint pain of ankle and foot, left; S/P ORIF (open reduction internal fixation) fracture; Sprain of ligament of left ankle, initial encounter        Insurance (Authorized # of Visits):  8 requested [facility])         Authorizing Physician: Dr. Dinora Dawn MD patient will demonstrate the ability to ambulate 200 feet without rest break to improve ability to perform community mobility. - progress  4.  The patient will demonstrate a TUG assessment that is <20 seconds to demonstrate a clinically meaningful change in forefoot x 1.5 laps // bars   - walking with cane for distance; 200ft with 2 standing rest breaks, additional 100ft following seated rest break, 1 standing to complete additional 100ft     Manual x 15 min  - L ankle and PF STM  - L ankle PROM  - L TCJ glid

## 2019-06-10 ENCOUNTER — TELEPHONE (OUTPATIENT)
Dept: PHYSICAL THERAPY | Facility: HOSPITAL | Age: 69
End: 2019-06-10

## 2019-06-10 ENCOUNTER — HOSPITAL ENCOUNTER (OUTPATIENT)
Dept: PHYSICAL THERAPY | Facility: HOSPITAL | Age: 69
Setting detail: THERAPIES SERIES
Discharge: HOME | End: 2019-06-10
Attending: ORTHOPAEDIC SURGERY
Payer: MEDICARE

## 2019-06-10 PROCEDURE — 97110 THERAPEUTIC EXERCISES: CPT

## 2019-06-10 PROCEDURE — 97112 NEUROMUSCULAR REEDUCATION: CPT

## 2019-06-10 NOTE — TELEPHONE ENCOUNTER
Left message with patient's care team to request an order for OT for L wrist. She is currently being seen in PT for her ankle.

## 2019-06-10 NOTE — PROGRESS NOTES
Dx: Joint pain of ankle and foot, left; S/P ORIF (open reduction internal fixation) fracture; Sprain of ligament of left ankle, initial encounter        Insurance (Authorized # of Visits):  8 requested [facility])         Authorizing Physician: Dr. Eulalio Norwood The patient will demonstrate a TUG assessment that is <20 seconds to demonstrate a clinically meaningful change in dynamic balance. - progress   5. The patient will be independent and adherent in a comprehensive HEP.  - issued and reviewed    Plan: Continue 200ft with 2 standing rest breaks, additional 100ft following seated rest break, 1 standing to complete additional 100ft   Therex x 15 min  - NuStep L4 x 6 min  - walking with cane, 150ft lap following NuStep, 2 standing rest breaks; end of session x 200ft

## 2019-06-13 ENCOUNTER — ORDER TRANSCRIPTION (OUTPATIENT)
Dept: PHYSICAL THERAPY | Facility: HOSPITAL | Age: 69
End: 2019-06-13

## 2019-06-13 ENCOUNTER — ORDER TRANSCRIPTION (OUTPATIENT)
Dept: OCCUPATIONAL MEDICINE | Facility: HOSPITAL | Age: 69
End: 2019-06-13

## 2019-06-13 ENCOUNTER — HOSPITAL ENCOUNTER (OUTPATIENT)
Dept: PHYSICAL THERAPY | Facility: HOSPITAL | Age: 69
Setting detail: THERAPIES SERIES
Discharge: HOME | End: 2019-06-13
Attending: ORTHOPAEDIC SURGERY
Payer: MEDICARE

## 2019-06-13 DIAGNOSIS — S52.90XA: Primary | ICD-10-CM

## 2019-06-13 DIAGNOSIS — S52.90XA RADIUS FRACTURE: Primary | ICD-10-CM

## 2019-06-13 DIAGNOSIS — S93.402A SPRAIN OF LEFT ANKLE: ICD-10-CM

## 2019-06-13 DIAGNOSIS — M25.572 JOINT PAIN OF ANKLE AND FOOT, LEFT: ICD-10-CM

## 2019-06-13 PROCEDURE — 97110 THERAPEUTIC EXERCISES: CPT

## 2019-06-13 PROCEDURE — 97112 NEUROMUSCULAR REEDUCATION: CPT

## 2019-06-13 NOTE — PROGRESS NOTES
Progress Summary  Dx: Joint pain of ankle and foot, left; S/P ORIF (open reduction internal fixation) fracture; Sprain of ligament of left ankle, initial encounter        Insurance (Authorized # of Visits):  8 requested [facility])         [id number] [address] Boyce Physician regards to ankle pain and strength since [facility] with self-report of diminished pain, feeling of improved mobility, and declining discomfort with functional mobility. Also demonstrates improving inversion and eversion strength to manual testing.  She continues Activity; Gait Training; Electrical Stim; Pt education; Home exercise program instructions       Patient/Family/Caregiver was advised of these findings, precautions, and treatment options and has agreed to actively participate in planning and for this cour hip/trunk x 20  - airex step ups with 2# BUE OH press x 5 R/L (faitgued)  - YTB side step with band around forefoot x 1.5 laps // bars   - walking with cane for distance; 200ft with 2 standing rest breaks, additional 100ft following seated rest break, 1 st 12 R/L  - bosu lunges with focus on ankle control x 10 R/L   - - - - - -   HEP: pursed lip breathing to improve oxygen saturation levels with activity, walking program (timing laps beginning with 2 min), standing weight shifts, seated ankle DF/PF, SLS with

## 2019-06-17 ENCOUNTER — OFFICE VISIT (OUTPATIENT)
Dept: OCCUPATIONAL MEDICINE | Age: 69
End: 2019-06-17
Attending: ORTHOPAEDIC SURGERY
Payer: MEDICARE

## 2019-06-17 DIAGNOSIS — S52.90XA RADIUS FRACTURE: ICD-10-CM

## 2019-06-17 PROCEDURE — 97110 THERAPEUTIC EXERCISES: CPT

## 2019-06-17 PROCEDURE — 97166 OT EVAL MOD COMPLEX 45 MIN: CPT

## 2019-06-17 NOTE — PROGRESS NOTES
OCCUPATIONAL THERAPY UPPER EXTREMITY EVALUATION   Referring Physician: Dr. Kassidy Stapleton  Diagnosis: Radius fx, left    Date of Service: 6/17/2019     PATIENT Ria Leigh is a 78year old y/o female who presents to therapy today with complaints o goals.     Precautions: Pacemaker, Drug Allergy  OBJECTIVE    OBSERVATION: Unremarkable     ORTHOTICS: None at this time    SCAR: Well healed incision     SENSORY: WNL    CIRCUMFERENTIAL EDEMA (cm):  Right Wrist crease: 16.3  Left Wrist crease: 16.3  Right 10 visits over a 90 day period.   Treatment will include: Manual Therapy, Self-Care Home Management, Therapeutic Activities, Therapeutic Exercise and Home Exercise Program instruction    Education or treatment limitation: None  Rehab Potential:good    Patie

## 2019-06-18 ENCOUNTER — HOSPITAL ENCOUNTER (OUTPATIENT)
Dept: PHYSICAL THERAPY | Facility: HOSPITAL | Age: 69
Setting detail: THERAPIES SERIES
Discharge: HOME | End: 2019-06-18
Attending: INTERNAL MEDICINE
Payer: MEDICARE

## 2019-06-18 PROCEDURE — 97112 NEUROMUSCULAR REEDUCATION: CPT

## 2019-06-18 PROCEDURE — 97110 THERAPEUTIC EXERCISES: CPT

## 2019-06-18 NOTE — PROGRESS NOTES
Dx: Joint pain of ankle and foot, left; S/P ORIF (open reduction internal fixation) fracture; Sprain of ligament of left ankle, initial encounter        Insurance (Authorized # of Visits):  8 requested [facility])         Authorizing Physician: Dr. Vázquez ref.  pro skilled PT to improve activity tolerance and functional strength. Goals: To be met in 8 visits  1. The patient will demonstrate ankle eversion strength MMT that is at least 4+/5. - progressing  2.  The patient will demonstrate the ability to stand fro cane, end session 100ft lap with cane as needed    Therex x 30 min  - NuStep L4 x 6 min  - AP rockerboard, cues for ankle recruitment as compared to hip/trunk x 20  - airex step ups with 2# BUE OH press x 5 R/L (faitgued)  - YTB side step with band around R/L NR x 30 min  - bosu step ups with 1UE support x 10 R/L  - bosu side step up/over and over/back, BUEs x 10 total  - bosu lunges x 8 R/L  - walking without cane, 30ft x 2  - negotiating hurdles and airex SBA x 6 laps  - SLS with 2 finger assist, 3 x 10 s

## 2019-06-25 ENCOUNTER — HOSPITAL ENCOUNTER (OUTPATIENT)
Dept: PHYSICAL THERAPY | Facility: HOSPITAL | Age: 69
Setting detail: THERAPIES SERIES
Discharge: HOME | End: 2019-06-25
Attending: INTERNAL MEDICINE
Payer: MEDICARE

## 2019-06-25 PROCEDURE — 97112 NEUROMUSCULAR REEDUCATION: CPT

## 2019-06-25 PROCEDURE — 97110 THERAPEUTIC EXERCISES: CPT

## 2019-06-25 NOTE — PROGRESS NOTES
Dx: Joint pain of ankle and foot, left; S/P ORIF (open reduction internal fixation) fracture; Sprain of ligament of left ankle, initial encounter        Insurance (Authorized # of Visits):  8 requested [facility])         Authorizing Physician: Dr. Vázquez ref.  pro prolonged standing. She requires continued skilled PT to improve activity tolerance and improve strength. Goals: To be met in 8 visits  1. The patient will demonstrate ankle eversion strength MMT that is at least 4+/5. - progressing  2.  The patient will min  - walking with cane, 150ft lap following NuStep, 2 standing rest breaks; end of session x 200ft with cane   - large rockerboard AP x 2 min  - alt tap to cone, no UE assist, SBA x 2 min Therex x 30 min  - NuStep L4 x 5 min  - goals assessment including airex, 2x30 sec to improve balance  Tandem stance on firm surface, 2x30 sec    - - - - X X   HEP: pursed lip breathing to improve oxygen saturation levels with activity, walking program (timing laps beginning with 2 min), standing weight shifts, seated ank

## 2019-06-27 ENCOUNTER — HOSPITAL ENCOUNTER (OUTPATIENT)
Dept: PHYSICAL THERAPY | Facility: HOSPITAL | Age: 69
Setting detail: THERAPIES SERIES
Discharge: HOME | End: 2019-06-27
Attending: ORTHOPAEDIC SURGERY
Payer: MEDICARE

## 2019-06-27 PROCEDURE — 97112 NEUROMUSCULAR REEDUCATION: CPT

## 2019-06-27 PROCEDURE — 97110 THERAPEUTIC EXERCISES: CPT

## 2019-06-27 NOTE — PROGRESS NOTES
Dx: Joint pain of ankle and foot, left; S/P ORIF (open reduction internal fixation) fracture; Sprain of ligament of left ankle, initial encounter        Insurance (Authorized # of Visits):  16 requested [facility])         Authorizing Physician: Dr. Anna Olivia MET  3. The patient will demonstrate the ability to ambulate 200 feet without rest break to improve ability to perform community mobility. - progressing, able to ambulate >200ft, but with multiple rest breaks  4.  The patient will demonstrate a TUG assessme vitals monitored, standing rest breaks PRN  Standing heel raises 2x10 to improve ankle strength Therex x 23 min  Standing toe taps on 4\" box, 2x5 ea to improve foot clearance and ankle stability  Step ups 4\" box 2x10 with single UE support   Standing hip NR x 1, Therex x 2       Total Timed Treatment: 43 min  Total Treatment Time: 43 min

## 2019-06-28 ENCOUNTER — OFFICE VISIT (OUTPATIENT)
Dept: OCCUPATIONAL MEDICINE | Age: 69
End: 2019-06-28
Attending: ORTHOPAEDIC SURGERY
Payer: MEDICARE

## 2019-06-28 DIAGNOSIS — S52.90XA RADIUS FRACTURE: ICD-10-CM

## 2019-06-28 PROCEDURE — 97140 MANUAL THERAPY 1/> REGIONS: CPT

## 2019-06-28 PROCEDURE — 97110 THERAPEUTIC EXERCISES: CPT

## 2019-06-28 NOTE — PROGRESS NOTES
Dx: radius fx         Insurance (Authorized # of Visits):  Medicare/BCBS           Authorizing Physician: Dr. Celia Rucker  Next MD visit: none scheduled  Fall Risk: standard         Precautions: n/a             Subjective: \"I haven't been real good about doi

## 2019-07-01 ENCOUNTER — HOSPITAL ENCOUNTER (OUTPATIENT)
Dept: PHYSICAL THERAPY | Facility: HOSPITAL | Age: 69
Setting detail: THERAPIES SERIES
Discharge: HOME | End: 2019-07-01
Attending: ORTHOPAEDIC SURGERY
Payer: MEDICARE

## 2019-07-01 PROCEDURE — 97112 NEUROMUSCULAR REEDUCATION: CPT

## 2019-07-01 PROCEDURE — 97110 THERAPEUTIC EXERCISES: CPT

## 2019-07-01 NOTE — PROGRESS NOTES
Dx: Joint pain of ankle and foot, left; S/P ORIF (open reduction internal fixation) fracture; Sprain of ligament of left ankle, initial encounter        Insurance (Authorized # of Visits):  16 requested [facility])         Authorizing Physician: Dr. Manoj Peguero eversion strength MMT that is at least 4+/5. - progressing  2. The patient will demonstrate the ability to stand from a chair with minimal use of upper extremity assist. - MET  3.  The patient will demonstrate the ability to ambulate 200 feet without rest b clearance and ankle stability  Step ups 4\" box 2x10 with single UE support   Standing hip abduction no UE support 2x10 ea Therex x 29 min  - walking overground x 4.5 min at patient tolerated pace, 7 standing rest breaks  - flight of stair ambulation (13 s beginning with 2 min), standing weight shifts, seated ankle DF/PF, SLS with 2 finger assist as needed, squats at sink    Charges: NR x 2, Therex x 1       Total Timed Treatment: 40 min  Total Treatment Time: 42 min

## 2019-07-02 ENCOUNTER — OFFICE VISIT (OUTPATIENT)
Dept: OCCUPATIONAL MEDICINE | Age: 69
End: 2019-07-02
Attending: ORTHOPAEDIC SURGERY
Payer: MEDICARE

## 2019-07-02 DIAGNOSIS — S52.90XA RADIUS FRACTURE: ICD-10-CM

## 2019-07-02 PROCEDURE — 97110 THERAPEUTIC EXERCISES: CPT

## 2019-07-02 PROCEDURE — 97140 MANUAL THERAPY 1/> REGIONS: CPT

## 2019-07-02 NOTE — PROGRESS NOTES
Dx: radius fx         Insurance (Authorized # of Visits):  Medicare/BCBS           Authorizing Physician: Dr. Amy Inman  Next MD visit: none scheduled  Fall Risk: standard         Precautions: n/a             Subjective:   \"I haven't done the hammer exerci HEP upgrades in bold    Charges: 1 MT, 2 TE       Total Timed Treatment: 45 min  Total Treatment Time: 45 min

## 2019-07-03 ENCOUNTER — HOSPITAL ENCOUNTER (OUTPATIENT)
Dept: PHYSICAL THERAPY | Facility: HOSPITAL | Age: 69
Setting detail: THERAPIES SERIES
Discharge: HOME | End: 2019-07-03
Attending: ORTHOPAEDIC SURGERY
Payer: MEDICARE

## 2019-07-03 PROCEDURE — 97112 NEUROMUSCULAR REEDUCATION: CPT

## 2019-07-03 PROCEDURE — 97110 THERAPEUTIC EXERCISES: CPT

## 2019-07-03 NOTE — PROGRESS NOTES
Dx: Joint pain of ankle and foot, left; S/P ORIF (open reduction internal fixation) fracture; Sprain of ligament of left ankle, initial encounter        Insurance (Authorized # of Visits):  16 requested [facility])         Authorizing Physician: Dr. Ernesto Mckeon at least 4+/5. - progressing  2. The patient will demonstrate the ability to stand from a chair with minimal use of upper extremity assist. - MET  3.  The patient will demonstrate the ability to ambulate 200 feet without rest break to improve ability to per (13 steps) with HRA   -  YTB side stepping with band around knees x 2 laps // bars   - squats at // bars 3 x 5 Therex x 15 min  - NuStep L5 x 5 min  - HELEN calf stretch off rockerboard, 3 x 30s  - 8\" fwd step ups with BUEs x 5 R/L  - squats at // bars 3 x walking program (timing laps beginning with 2 min), standing weight shifts, seated ankle DF/PF, SLS with 2 finger assist as needed, squats at sink    Charges: NR x 2, Therex x 2        Total Timed Treatment: 55 min  Total Treatment Time: 55 min

## 2019-07-08 ENCOUNTER — HOSPITAL ENCOUNTER (OUTPATIENT)
Dept: PHYSICAL THERAPY | Facility: HOSPITAL | Age: 69
Setting detail: THERAPIES SERIES
Discharge: HOME | End: 2019-07-08
Attending: ORTHOPAEDIC SURGERY
Payer: MEDICARE

## 2019-07-08 PROCEDURE — 97110 THERAPEUTIC EXERCISES: CPT

## 2019-07-08 NOTE — PROGRESS NOTES
Dx: Joint pain of ankle and foot, left; S/P ORIF (open reduction internal fixation) fracture; Sprain of ligament of left ankle, initial encounter        Insurance (Authorized # of Visits):  16 requested [facility])         Authorizing Physician: Dr. Vázquez ref.  prov minimal use of upper extremity assist. - MET  3. The patient will demonstrate the ability to ambulate 200 feet without rest break to improve ability to perform community mobility. - progressing, able to ambulate >200ft, but with multiple rest breaks  4.  Argelia Partida squats at // bars 3 x 5 Therex x 15 min  - NuStep L5 x 5 min  - HELEN calf stretch off rockerboard, 3 x 30s  - 8\" fwd step ups with BUEs x 5 R/L  - squats at // bars 3 x 5 Therex x 30 min  - NuStep L5 x 5 min  - HELEN calf stretch off rockerboard, 3 x 30s  - airex beam, tandem walking x 5 laps, intermittent UE taps  - mod tandem catch x 18 R/L  - mod tandem, 2# ball twists x 10R/L -   - X X - - - -   HEP: pursed lip breathing to improve oxygen saturation levels with activity, walking program (timing laps begin

## 2019-07-09 ENCOUNTER — OFFICE VISIT (OUTPATIENT)
Dept: OCCUPATIONAL MEDICINE | Age: 69
End: 2019-07-09
Attending: ORTHOPAEDIC SURGERY
Payer: MEDICARE

## 2019-07-09 DIAGNOSIS — S52.90XA RADIUS FRACTURE: ICD-10-CM

## 2019-07-09 PROCEDURE — 97140 MANUAL THERAPY 1/> REGIONS: CPT

## 2019-07-09 PROCEDURE — 97110 THERAPEUTIC EXERCISES: CPT

## 2019-07-09 NOTE — PROGRESS NOTES
Dx: radius fx         Insurance (Authorized # of Visits):  Medicare/BCBS           Authorizing Physician: Dr. Eligio Lan  Next MD visit: none scheduled  Fall Risk: standard         Precautions: n/a             Subjective:    \"I've almost got it where I can yellow foam cubes Towel twist to simulate door open/close (isometric) Roll/pinch green foam cubes     In-hand manipulation with small objects Palmar flattening/creasing.   WB on table top    Prayer stretch                                 HEP: HEP upgrades i

## 2019-07-11 ENCOUNTER — HOSPITAL ENCOUNTER (OUTPATIENT)
Dept: PHYSICAL THERAPY | Facility: HOSPITAL | Age: 69
Setting detail: THERAPIES SERIES
Discharge: HOME | End: 2019-07-11
Attending: INTERNAL MEDICINE
Payer: MEDICARE

## 2019-07-11 PROCEDURE — 97110 THERAPEUTIC EXERCISES: CPT

## 2019-07-11 NOTE — PROGRESS NOTES
Progress/Discharge Summary  Dx: Joint pain of ankle and foot, left; S/P ORIF (open reduction internal fixation) fracture; Sprain of ligament of left ankle, initial encounter        Insurance (Authorized # of Visits):  16 requested [facility])         69 Dylan Ibarra (MMT): 4+/5 HELEN  INV: R 5/5; L 5/5  EV: R 5/5; L 5/5      Postural Control: (7/11/19)  Feet together: >30 sec  Mod Tandem: >30 sec HELEN with min sway  Tandem: >30 sec HELEN (2 attempts ea)  SLS: R 11 sec; L 7 sec     Functional Mobility: (7/11/19)  5x STS: 13 nearly MET, ambulates 200ft without AD, but requires 2 standing rest breaks  4.  The patient will demonstrate a TUG assessment that is <12 seconds to demonstrate a clinically meaningful change in dynamic balance and dec risk for falls at home and in the com Therex x 31 min  - NuStep L4 x 4 min  -sit<>stand from low mat table onto black foam pad, 5x with no UE support  Ambulation in lap around PT gym with no AD, vitals monitored, standing rest breaks PRN  Standing heel raises 2x10 to improve ankle strength T tandem stance on airex, 2x30 sec to improve balance  Tandem stance on firm surface, 2x30 sec  NR x 14 min  - sand dune step ups, light UE assist for balance x 10 R/L  - cory thompson with tap to target, sets to fatigue x 3 (~4 ea R/L, ea set)  - cory treviño

## 2019-07-12 ENCOUNTER — OFFICE VISIT (OUTPATIENT)
Dept: OCCUPATIONAL MEDICINE | Age: 69
End: 2019-07-12
Attending: ORTHOPAEDIC SURGERY
Payer: MEDICARE

## 2019-07-12 DIAGNOSIS — S52.90XA RADIUS FRACTURE: ICD-10-CM

## 2019-07-12 PROCEDURE — 97140 MANUAL THERAPY 1/> REGIONS: CPT

## 2019-07-12 PROCEDURE — 97110 THERAPEUTIC EXERCISES: CPT

## 2019-07-12 NOTE — PROGRESS NOTES
Dx: radius fx         Insurance (Authorized # of Visits):  Medicare/BCBS           Authorizing Physician: Dr. Ankita Hui  Next MD visit: none scheduled  Fall Risk: standard         Precautions: n/a              Discharge Summary  Pt has attended 5 visits in TX#: 4/8 Date:   7/12/19              TX#: 5/8 Date:    Tx#: 6/   A/AA/PROM to wrist and forearm, gentle joint mobilization A/AA/PROM to wrist and forearm, gentle joint mobilization A/AA/PROM to wrist and forearm, gentle joint mobilization A/AA/PROM to

## 2020-02-20 PROBLEM — J44.9 ASTHMA WITH COPD (HCC): Status: ACTIVE | Noted: 2020-02-20

## 2020-02-20 PROBLEM — J96.11 CHRONIC RESPIRATORY FAILURE WITH HYPOXIA (HCC): Status: ACTIVE | Noted: 2020-02-20

## 2020-02-20 PROBLEM — J44.9 CHRONIC OBSTRUCTIVE PULMONARY DISEASE, UNSPECIFIED COPD TYPE (HCC): Status: ACTIVE | Noted: 2020-02-20

## 2020-11-04 ENCOUNTER — OFFICE VISIT (OUTPATIENT)
Dept: FAMILY MEDICINE CLINIC | Facility: CLINIC | Age: 70
End: 2020-11-04
Payer: MEDICARE

## 2020-11-04 VITALS
DIASTOLIC BLOOD PRESSURE: 70 MMHG | SYSTOLIC BLOOD PRESSURE: 118 MMHG | BODY MASS INDEX: 23.9 KG/M2 | OXYGEN SATURATION: 95 % | TEMPERATURE: 98 F | WEIGHT: 140 LBS | HEART RATE: 82 BPM | HEIGHT: 64 IN | RESPIRATION RATE: 18 BRPM

## 2020-11-04 DIAGNOSIS — L30.9 DERMATITIS: Primary | ICD-10-CM

## 2020-11-04 PROCEDURE — 99213 OFFICE O/P EST LOW 20 MIN: CPT | Performed by: PHYSICIAN ASSISTANT

## 2020-11-04 RX ORDER — TRIAMCINOLONE ACETONIDE 5 MG/G
CREAM TOPICAL
Qty: 60 G | Refills: 0 | Status: SHIPPED | OUTPATIENT
Start: 2020-11-04

## 2020-11-04 NOTE — PROGRESS NOTES
CHIEF COMPLAINT:   Patient presents with:  Rash: left leg x 10 day         HPI:     Cassidy Eagle is a 69year old female who presents for evaluation of a rash. Per patient rash started in the past 10 days. initially describes as an itchy rash.   Had a r 1 capsule 0   • bumetanide 1 MG Oral Tab 1 mg every morning. Take 2 tablets by mouth daily  1 tablet 0   • Warfarin Sodium 3 MG Oral Tab Take 3 mg by mouth.  Take 3mg by mouth on Wed, Fri and Sun, 6mg on other days  1 tablet 0   • Fluticasone Propionate 50 mediastinal    • LVAD (left ventricular assist device) present [facility])    • Multiple thyroid nodules    • Osteopenia       Past Surgical History:   Procedure Laterality Date   • CARDIAC DEFIBRILLATOR PLACEMENT      [employer]-2004, 8/2012   • Wolfgang Bustillos bilaterally. Normal external nose. Nasal mucosa pink without edema. No erythema of the throat. Oropharynx moist without lesions. LUNGS: Clear to auscultation bilaterally. No wheezing, rhonchi, or rales. No diminished breath sounds.  No increased work of

## 2022-04-14 NOTE — TELEPHONE ENCOUNTER
LVM for patient's LVAD nurse  to discuss oxygen use. Patient wanting to wean use of O2, would like to speak with patient's care team before doing so to ensure safety. Patient currently on 2L O2. Spoke with a patient regarding results

## (undated) NOTE — LETTER
Patient Name: Anders Councilman  YOB: 1950          MRN number:  [id number]  Date:  6/14/2019  Referring Physician:  Delmi Roberts     Progress Summary  Dx: Joint pain of ankle and foot, left; S/P ORIF (open reduction internal fixation) frac step-to pattern, HRA, no rest breaks   Walking for distance: 300ft with 4 rest breaks, 3 standing and 1 seated (6/6/19)        Assessment: Patient making great progress in regards to ankle pain and strength since [facility] with self-report of diminished pain, fe Plan: Continue skilled Physical Therapy 2 x/week or a total of 16 visits over a 90 day period. Treatment will include: Manual Therapy; Therapeutic Exercises; Neuromuscular Re-education; Therapeutic Activity; Gait Training; Electrical Stim; Pt education;  Nessa Merino

## (undated) NOTE — LETTER
Patient Name: Erin De Los Santos  YOB: 1950          MRN number:  [id number]  Date:  1/6/2018  Referring Physician:  Dr. Barry Cotton, Dr. Ashish Caldwell Summary  Dx: Gait instability, Heart failure, LVAD (left ventricular assist device) present be noted that was performed after other testing and stairs (1/5/18)  TUG (no AD): 9.6 sec (1/5/18)  30 sec STS: 13x (1/5/18)  DGI: 17/24 (1/5/18)    4 Stage Balance Test: (1/5/18)  Feet together: >30 sec  Modified Tandem: >30 sec HELEN  Tandem: R back >30 se Patient/Family/Caregiver was advised of these findings, precautions, and treatment options and has agreed to actively participate in planning and for this course of care.     Thank you for your referral. Please co-sign or sign and return this letter via fax

## (undated) NOTE — LETTER
Patient Name: Markus Rogers  YOB: 1950          MRN number:  [id number]  Date:  10/6/2017  Referring Physician:  Iftikhar Canseco        NEUROLOGICAL EVALUATION:    Referring Physician: Gilma Warner MD  Diagnosis: Gait instability (R26.81 Patient's impairments include; LE weakness, decreased balance, impaired cardiovascular and muscular endurance, decreased core strength, and impaired postural awareness.  These impairments are leading to functional limitations including;  Listed above    Yv Today’s Treatment and Response:   HEP Issued and Handouts Given  Charges: PT Eval High Complexity,      Total Treatment Time: 45 min   In agreement with FOTO score and clinical rationale, this evaluation involved High Complexity decision making due to 3+ p via fax as soon as possible to [phone].  If you have any questions, please contact me at Dept: [phone]    Sincerely,  Electronically signed by therapist: Myriam Espinal, PT    [de-identified] certification required: Yes  I certify the need for thes

## (undated) NOTE — LETTER
Patient Name: Cassidy Eagle  YOB: 1950          MRN number:  [id number]  Date:  12/10/2017  Referring Physician:  Dr. Aziza Johns     Progress Summary  Dx: Gait instability, Heart failure, LVAD (left ventricular assist device) pre sec (10/25/17)  6MWT: 415 ft with 2 standing rest breaks (12/8/17)    TUG (no AD): 12.04 sec (12/8/17)  30 sec STS: 10x (12/8/17)    4 Stage Balance Test: (12/8/17)  Feet together: >30 sec  Modified Tandem: >30 sec HELEN  Tandem: R back 26 sec, L back 24 sec for this course of care. Thank you for your referral. Please co-sign or sign and return this letter via fax as soon as possible to [phone]. If you have any questions, please contact me at Dept: [phone].     Sincerely,  Electronically signed by

## (undated) NOTE — MR AVS SNAPSHOT
[address] [facility]  [place] [address]  [phone]               Thank you for choosing us for your health care visit with MAGDALENA Rivera.   We are glad to serve you and happy to provide you with this What changed:  Another medication with the same name was removed. Continue taking this medication, and follow the directions you see here.            Potassium Chloride ER 20 MEQ Tbcr   Commonly known as:  K-DUR M20           Raloxifene HCl 60 MG Tabs   [facility] Avoid over sized portions. Don’t eat while when you’re bored.      EAT THESE FOODS MORE OFTEN: EAT THESE FOODS LESS OFTEN:   Make half your plate fruits and vegetables Highly refined, white starches including white bread, rice and pasta   Eat plenty of pr

## (undated) NOTE — LETTER
Patient Name: Priyank Chahal  YOB: 1950          MRN number:  [id number]  Date:  6/17/2019  Referring Physician:  Halima Roberts          OCCUPATIONAL THERAPY UPPER EXTREMITY EVALUATION    Referring Physician: Dr. Kassidy Stapleton  Diagnosis: Elester Proper pathology, POC and HEP. Pt voiced understanding and performs HEP correctly without reported pain. Skilled Occupational Therapy is medically necessary to address the above impairments and reach functional goals.      Precautions: Pacemaker, Drug Allergy  OB 3.  Pt will increase left  strength to at least 30 lb for use while holding jar to open. 4.  Pt will increase left 3 point pinch to at least 8 lb for use while opening containers.      Frequency / Duration: Patient will be seen for 2 x/week or a total

## (undated) NOTE — LETTER
Patient Name: Cody Malave  YOB: 1950          MRN number:  [id number]  Date:  7/12/2019  Referring Physician:  Dr. Garth Barton       Progress/Discharge Summary  Dx: Joint pain of ankle and foot, left; S/P ORIF (open reduction internal fixati with 2 brief standing rest breaks (7/11/19)    Strength/MMT: (7/11/19)  Knee Foot/Ankle   Flexion: R 5/5; L 5/5  Extension: R 4+/5; L 4+/5    DF: R 5/5; L 5/5  PF (MMT): 4+/5 HELEN  INV: R 5/5; L 5/5  EV: R 5/5; L 5/5      Postural Control: (7/11/19)  Feet t minimal use of upper extremity assist. - MET  3. The patient will demonstrate the ability to ambulate 200 feet without rest break to improve ability to perform community mobility.  - nearly MET, ambulates 200ft without AD, but requires 2 standing rest break Certification From: 3/35/4083  To:10/10/2019

## (undated) NOTE — LETTER
Patient Name: Abel Hector  YOB: 1950          MRN number:  [id number]  Date:  5/22/2019  Referring Physician:  Dr. Gavino Rose MD          LOWER EXTREMITY EVALUATION:   Referring Physician: Dr. Gavino Rose  Diagnosis: Joint pain of ankle and f Current functional limitations include walking in home and community, sit<>stand transfers, activity tolerance, ankle strength.    [place] describes prior level of function able to ambulate without assistive device, able to ambulate longer distances, was not with LVAD in place. She uses a single point cane in the R UE with ambulation  Gait: gait is with SP cane in R UE, the patient has short step lengths bilaterally. She puts minimal UE support through the cane.  The patient requires occasional standing rest br demonstrate a clinically meaningful change in dynamic balance   5. The patient will be independent and adherent in a comprehensive HEP    Frequency / Duration: Patient will be seen for 2 x/week or a total of 8 visits over a 90 day period.  Treatment will in

## (undated) NOTE — LETTER
Patient Name: Arelis Rangel  YOB: 1950          MRN number:  [id number]  Date:  1/31/2018  Referring Physician:  Dr. Ritesh Small, Dr. Precious Bergman     Discharge Summary  Dx: Gait instability, Heart failure, LVAD (left ventricular assist device) presen Objective:   6MWT: 719ft  TUG (no AD): 8.7 sec   30 sec STS: 13x (1/5/18)  DGI: 22/24     4 Stage Balance Test:  Feet together: >30 sec  Modified Tandem: >30 sec HELEN  Tandem: R back >30 sec, L back 28 sec  SLS: R 11 sec (previously broken knee and ankle) Electronically signed by therapist: Bakari Birmingham, PT, DPT

## (undated) NOTE — LETTER
Patient Name: Darvin Bryant  YOB: 1950          MRN number:  [id number]  Date:  7/12/2019  Referring Physician:  Telford Bernheim    Discharge Summary  Initial Functional Outcome Score 39/100  Final Functional Outcome Score 44/100  Number o Certification From: 4/67/2991  To:10/10/2019